# Patient Record
Sex: FEMALE | HISPANIC OR LATINO | URBAN - METROPOLITAN AREA
[De-identification: names, ages, dates, MRNs, and addresses within clinical notes are randomized per-mention and may not be internally consistent; named-entity substitution may affect disease eponyms.]

---

## 2021-08-02 ENCOUNTER — APPOINTMENT (OUTPATIENT)
Dept: RADIOLOGY | Facility: IMAGING CENTER | Age: 21
End: 2021-08-02
Attending: FAMILY MEDICINE
Payer: MEDICAID

## 2021-08-02 ENCOUNTER — OFFICE VISIT (OUTPATIENT)
Dept: URGENT CARE | Facility: CLINIC | Age: 21
End: 2021-08-02
Payer: MEDICAID

## 2021-08-02 VITALS
OXYGEN SATURATION: 99 % | BODY MASS INDEX: 23.74 KG/M2 | SYSTOLIC BLOOD PRESSURE: 114 MMHG | HEIGHT: 62 IN | DIASTOLIC BLOOD PRESSURE: 72 MMHG | HEART RATE: 75 BPM | RESPIRATION RATE: 12 BRPM | TEMPERATURE: 98 F | WEIGHT: 129 LBS

## 2021-08-02 DIAGNOSIS — M25.552 LEFT HIP PAIN: ICD-10-CM

## 2021-08-02 PROCEDURE — 73502 X-RAY EXAM HIP UNI 2-3 VIEWS: CPT | Mod: TC,LT | Performed by: FAMILY MEDICINE

## 2021-08-02 PROCEDURE — 99204 OFFICE O/P NEW MOD 45 MIN: CPT | Performed by: FAMILY MEDICINE

## 2021-08-02 PROCEDURE — 72100 X-RAY EXAM L-S SPINE 2/3 VWS: CPT | Mod: TC | Performed by: FAMILY MEDICINE

## 2021-08-03 NOTE — PROGRESS NOTES
"Subjective:      Chief Complaint   Patient presents with   • Hip Pain     x 2 year L side. Pt. is in the [employer] and injured her L hip in training. She heard a pop on the five fifty cord. She has pain when she walks for a long distance or exercise.               DOI:  7/22      She tripped over some cord and fell on hard ground, landing on left hip.        Now c/o intermittent, left hip soreness, but sometimes the pain is sharp but the pain does not radiate.    She also notes some mild left lumbar pain, since the injury.   Pain is worse with wtbearing.          Social hx - denies tobacco, alcohol, drug use  Past medical history was unremarkable and not pertinent to current issue  No Known Allergies  Family history was reviewed and not pertinent         Social History     Tobacco Use   • Smoking status: Never Smoker   • Smokeless tobacco: Never Used   Vaping Use   • Vaping Use: Never used   Substance Use Topics   • Alcohol use: Never   • Drug use: Never         No past medical history on file.      Current Outpatient Medications on File Prior to Visit   Medication Sig Dispense Refill   • butalbital-acetaminophen-caffeine-codeine (FIORICET W/CODEINE) -61-30 MG per capsule Take 1 Cap by mouth every 6 hours as needed for Migraine. (Patient not taking: Reported on 8/2/2021) 20 Cap 0     No current facility-administered medications on file prior to visit.             Review of Systems   Constitutional: Negative for fever.   Respiratory: Negative for shortness of breath.    Cardiovascular: Negative for chest pain.   Neurological: Negative for tingling.   All other systems reviewed and are negative.         Objective:     /72   Pulse 75   Temp 36.7 °C (98 °F) (Temporal)   Resp 12   Ht 1.575 m (5' 2\")   Wt 58.5 kg (129 lb)   SpO2 99%       Physical Exam   Constitutional: pt is oriented to person, place, and time. Pt appears well-developed. No distress.   HENT:   Head: Normocephalic and " atraumatic.   Eyes: Conjunctivae are normal.   Cardiovascular: Normal rate.    Pulmonary/Chest: Effort normal.   Musculoskeletal:        Left hip: pt exhibits tenderness (over greater trochanter). Pt exhibits normal range of motion, normal strength and no crepitus.   No bruising or swelling       Lumbar spine: Normal ROM. Pt exhibits no left sided muscular tenderness.   Neurological: pt is alert and oriented to person, place, and time.   Skin: Skin is warm. He is not diaphoretic. No erythema.   Psychiatric: pt behavior is normal.   Nursing note and vitals reviewed.                            Reading Physician Reading Date Result Priority   Caro Freedman M.D.  [phone] 8/2/2021 [facility]      Narrative & Impression     8/2/2021 5:37 PM     HISTORY/REASON FOR EXAM:  Pain Following Trauma        TECHNIQUE/ EXAM DESCRIPTION AND NUMBER OF VIEWS:  3 views of the lumbar spine.     COMPARISON: None.     FINDINGS:  There are 5 nonrib-bearing lumbar vertebra. There is minimal retrolisthesis of L5 on S1. Intervertebral disc spaces are maintained. Alignment of the facet joints is maintained. No compression fracture is identified.     IMPRESSION:     1.  Minimal retrolisthesis of L5 on S1.  2.  No compression fracture is identified.         Last Resulted: 08/02/21  5:53 PM           Details    Reading Physician Reading Date Result Priority   Caro Freedman M.D.  [phone] 8/2/2021 Urgent [facility]      Narrative & Impression     8/2/2021 5:37 PM     HISTORY/REASON FOR EXAM:  Pelvic/Hip Pain Following Trauma.        TECHNIQUE/EXAM DESCRIPTION AND NUMBER OF VIEWS:  2 views of the LEFT hip.     COMPARISON: None     FINDINGS:  Femoral heads are seated within the acetabula. No fracture or dislocation is seen. There is no diastases of the symphysis pubis. SI joints are symmetric.     IMPRESSION:     No fracture or dislocation is seen.         Last Resulted: 08/02/21  5:51 PM               Assessment/Plan:           1.  Left hip strain  2. Lumbar strain       xrays personally reviewed.  No fracture.          Minimal retrolisthesis of L5 on S1.    Recommend light duty x 1 wk      - Diclofenac Sodium (VOLTAREN) 1 % Gel; Apply 4 g to skin as directed 3 times a day as needed.  Dispense: 1 Tube; Refill: 0    Follow up in one week if no improvement, sooner if symptoms worsen.           My total time spent caring for the patient on the day of the encounter was at least 45 minutes.   This does not include time spent on separately billable procedures/tests.

## 2021-08-09 ENCOUNTER — OFFICE VISIT (OUTPATIENT)
Dept: URGENT CARE | Facility: CLINIC | Age: 21
End: 2021-08-09
Payer: OTHER GOVERNMENT

## 2021-08-09 VITALS
HEIGHT: 62 IN | BODY MASS INDEX: 24.66 KG/M2 | DIASTOLIC BLOOD PRESSURE: 64 MMHG | HEART RATE: 74 BPM | RESPIRATION RATE: 14 BRPM | TEMPERATURE: 97.9 F | SYSTOLIC BLOOD PRESSURE: 110 MMHG | WEIGHT: 134 LBS | OXYGEN SATURATION: 100 %

## 2021-08-09 DIAGNOSIS — S76.012A MUSCLE STRAIN OF LEFT HIP, INITIAL ENCOUNTER: ICD-10-CM

## 2021-08-09 DIAGNOSIS — M25.552 LEFT HIP PAIN: ICD-10-CM

## 2021-08-09 PROCEDURE — 99214 OFFICE O/P EST MOD 30 MIN: CPT | Performed by: NURSE PRACTITIONER

## 2021-08-09 ASSESSMENT — ENCOUNTER SYMPTOMS
TINGLING: 0
BACK PAIN: 0
FEVER: 0
FALLS: 0

## 2021-08-09 NOTE — PROGRESS NOTES
"Pina Ovalle is a 21 y.o. female who presents for Hip Pain ((L) x 7 days follow up. )      HPI This is a new problem.  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 Now increased pain after a trip and stumble while on duty with  July 22, 2021. She was walking and tripped over the cord. She is in the [employer]. Rest helps. Pain occurs randomly. Pain lasts only seconds. Pain 8/10.  Walking or carrying weight makes the pain worse.   Pain in all around in her left hip.  Full movement of her hip. Treatment tried: ice, ibuprofen, tylenol. Does not help. No sports activities. 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  Seen in urgent care on Aug 8 2021. She did not  RX cream that was prescribed.     Review of Systems   Constitutional: Negative for fever.   Musculoskeletal: Positive for joint pain. Negative for back pain and falls.   Neurological: Negative for tingling.       Allergies:       Allergies   Allergen Reactions   • Nkda [No Known Drug Allergy]        PMSFS Hx:  History reviewed. No pertinent past medical history.  History reviewed. No pertinent surgical history.  History reviewed. No pertinent family history.  Social History     Tobacco Use   • Smoking status: Never Smoker   • Smokeless tobacco: Never Used   Substance Use Topics   • Alcohol use: Never       Problems:   There is no problem list on file for this patient.      Medications:   Current Outpatient Medications on File Prior to Visit   Medication Sig Dispense Refill   • butalbital-acetaminophen-caffeine-codeine (FIORICET W/CODEINE) -58-30 MG per capsule Take 1 Cap by mouth every 6 hours as needed for Migraine. 20 Cap 0     No current facility-administered medications on file prior to visit.          Objective:     /64   Pulse 74   Temp 36.6 °C (97.9 °F) (Temporal)   Resp 14   Ht 1.575 m (5' 2\")   Wt 60.8 kg (134 " lb)   LMP 07/30/2021   SpO2 100%   BMI 24.51 kg/m²     Physical Exam  Vitals and nursing note reviewed.   Constitutional:       General: She is not in acute distress.     Appearance: She is well-developed. She is not toxic-appearing.   HENT:      Head: Normocephalic.   Cardiovascular:      Rate and Rhythm: Normal rate and regular rhythm.      Pulses: Normal pulses.   Pulmonary:      Effort: Pulmonary effort is normal.      Breath sounds: Normal breath sounds.   Musculoskeletal:      Left hip: Normal.        Legs:       Comments: Left hip pops with examination.      Skin:     General: Skin is warm and dry.      Capillary Refill: Capillary refill takes less than 2 seconds.   Neurological:      Mental Status: She is alert and oriented to person, place, and time.   Psychiatric:         Mood and Affect: Mood normal.         Behavior: Behavior normal. Behavior is cooperative.         Assessment /Associated Orders:      1. Left hip pain  REFERRAL TO PHYSICAL THERAPY    AMB REFERRAL TO ESTABLISH WITH [facility]   2. Muscle strain of left hip, initial encounter  AMB REFERRAL TO ESTABLISH WITH [facility]CP         Medical Decision Making:    Pt is clinically stable at today's acute urgent care visit.  No acute distress noted. Appropriate for outpatient management at this time.   Acute problem today with uncertain prognosis.    Referral to PT  Referral to PCP      RX at pharmacy Vol[facility] gel  Daily stretches   Physical therapy   OTC  analgesic of choice (acetaminophen or NSAID). Follow manufactures dosing and safety precautions.   Activity as tolerated.       Advised to follow-up with the primary care provider for recheck, reevaluation, and consideration of further management if necessary.   Discussed management options (risks,benefits, and alternatives to treatment). Expressed understanding and the treatment plan was agreed upon. Questions were encouraged and answered       Return to urgent care prn if new or  worsening sx or if there is no improvement in condition prn.  Educated in Red flags and indications to immediately call 911 or present to the Emergency Department.     I personally reviewed prior external notes and test results pertinent to today's visit.  I have independently reviewed and interpreted all diagnostics ordered during this urgent care acute visit.   Time spent evaluating this patient was at least 30 minutes and includes preparing for visit, counseling/education, exam and evaluation, obtaining history, independent interpretation, ordering lab/test/procedures,medication management and documentation.Time does not include separately billable procedures noted .

## 2021-09-07 ENCOUNTER — TELEPHONE (OUTPATIENT)
Dept: URGENT CARE | Facility: CLINIC | Age: 21
End: 2021-09-07

## 2021-09-07 NOTE — TELEPHONE ENCOUNTER
"Pt called requesting an update on her PT referral (referral dept number provided by Benjamin MULLER).     Pt is also requesting paperwork be filled out for the  \"evaluation by a non  provider,\" if possible. Advised pt she may need to see PCP for this but will fwd to Maria Guadalupe in case she is able.   "

## 2021-09-08 NOTE — TELEPHONE ENCOUNTER
Called to inform pt that we will NOT be able to complete her paperwork in urgent care and to contact our referrals dept (number provided) to get her PT visits approved an scheduled. Pt had no further questions.

## 2022-08-26 ENCOUNTER — OFFICE VISIT (OUTPATIENT)
Dept: URGENT CARE | Facility: CLINIC | Age: 22
End: 2022-08-26
Payer: MEDICAID

## 2022-08-26 VITALS
HEIGHT: 62 IN | RESPIRATION RATE: 12 BRPM | OXYGEN SATURATION: 99 % | DIASTOLIC BLOOD PRESSURE: 62 MMHG | BODY MASS INDEX: 23.81 KG/M2 | TEMPERATURE: 97.9 F | SYSTOLIC BLOOD PRESSURE: 112 MMHG | HEART RATE: 69 BPM | WEIGHT: 129.4 LBS

## 2022-08-26 DIAGNOSIS — K59.01 SLOW TRANSIT CONSTIPATION: ICD-10-CM

## 2022-08-26 DIAGNOSIS — R12 HEARTBURN: ICD-10-CM

## 2022-08-26 PROCEDURE — 99213 OFFICE O/P EST LOW 20 MIN: CPT | Performed by: NURSE PRACTITIONER

## 2022-08-27 ASSESSMENT — ENCOUNTER SYMPTOMS
ABDOMINAL PAIN: 0
CONSTIPATION: 1
HOARSE VOICE: 1
SORE THROAT: 1

## 2022-08-27 NOTE — PROGRESS NOTES
Subjective     Pina Ovalle is a 22 y.o. female who presents with Pharyngitis (Pt is fatigue, sore throat, body ache, constipation x 2 week )            Pharyngitis   This is a new problem. Episode onset: pt reports she noticed a ST and hoarse voice that started a few days ago. She admits to recent constipation as she has not had a BM in several days. reports her appetite is normal and she continues to eat. her voice is most notably hoarse in the am. Neither side of throat is experiencing more pain than the other. There has been no fever. Associated symptoms include a hoarse voice. Pertinent negatives include no abdominal pain. Associated symptoms comments: Pt admits that she did a body building competition a few weeks ago and has been very strict with her diet for a few months. When the competition was over, she admits to binge eating all the food she wasn't allowed to have. Most of which is high carb and high sugar. After this is when she became constipated and developed a ST with hoarse voice. She has tried nothing for the symptoms.     Review of Systems   HENT:  Positive for hoarse voice and sore throat.    Gastrointestinal:  Positive for constipation. Negative for abdominal pain.   All other systems reviewed and are negative.       History reviewed. No pertinent past medical history. History reviewed. No pertinent surgical history.   Social History     Socioeconomic History    Marital status: Single     Spouse name: Not on file    Number of children: Not on file    Years of education: Not on file    Highest education level: Not on file   Occupational History    Not on file   Tobacco Use    Smoking status: Never    Smokeless tobacco: Never   Vaping Use    Vaping Use: Never used   Substance and Sexual Activity    Alcohol use: Never    Drug use: Never    Sexual activity: Not on file   Other Topics Concern    Behavioral problems Not Asked    Interpersonal relationships Not Asked    Sad or not enjoying  "activities Not Asked    Suicidal thoughts Not Asked    Poor school performance Not Asked    Reading difficulties Not Asked    Speech difficulties Not Asked    Writing difficulties Not Asked    Inadequate sleep Not Asked    Excessive TV viewing Not Asked    Excessive video game use Not Asked    Inadequate exercise Not Asked    Sports related Not Asked    Poor diet Not Asked    Family concerns for drug/alcohol abuse Not Asked    Poor oral hygiene Not Asked    Bike safety Not Asked    Family concerns vehicle safety Not Asked   Social History Narrative    Not on file     Social Determinants of Health     Financial Resource Strain: Not on file   Food Insecurity: Not on file   Transportation Needs: Not on file   Physical Activity: Not on file   Stress: Not on file   Social Connections: Not on file   Intimate Partner Violence: Not on file   Housing Stability: Not on file         Objective     /62 (BP Location: Left arm, Patient Position: Sitting, BP Cuff Size: Adult)   Pulse 69   Temp 36.6 °C (97.9 °F) (Temporal)   Resp 12   Ht 1.575 m (5' 2\")   Wt 58.7 kg (129 lb 6.4 oz)   SpO2 99%   BMI 23.67 kg/m²      Physical Exam  Vitals and nursing note reviewed.   Constitutional:       Appearance: Normal appearance. She is normal weight.   HENT:      Head: Normocephalic and atraumatic.      Nose: Nose normal.      Mouth/Throat:      Mouth: Mucous membranes are moist.      Pharynx: Oropharynx is clear. No posterior oropharyngeal erythema.   Eyes:      Extraocular Movements: Extraocular movements intact.      Pupils: Pupils are equal, round, and reactive to light.   Cardiovascular:      Rate and Rhythm: Normal rate and regular rhythm.   Pulmonary:      Effort: Pulmonary effort is normal.   Abdominal:      General: Abdomen is flat.      Tenderness: There is no abdominal tenderness. There is no guarding or rebound.   Musculoskeletal:         General: Normal range of motion.      Cervical back: Normal range of motion. "   Skin:     General: Skin is warm and dry.      Capillary Refill: Capillary refill takes less than 2 seconds.   Neurological:      General: No focal deficit present.      Mental Status: She is alert and oriented to person, place, and time. Mental status is at baseline.   Psychiatric:         Mood and Affect: Mood normal.         Speech: Speech normal.         Thought Content: Thought content normal.         Judgment: Judgment normal.                           Assessment & Plan        1. Heartburn    2. Slow transit constipation    She is likely experiencing reflux secondary to constipation and abd fullness  Encouraged pt to use OTC pepcid daily for 2 weeks at least  Daily miralax until stool is soft and regular  Try to make better choices with foods and have a more balanced diet  Increase water intake  Encouraged light exercise  Red flags discussed and when to seek care in the ER  Supportive care, differential diagnoses, and indications for immediate follow-up discussed with patient.    Pathogenesis of diagnosis discussed including typical length and natural progression.    Instructed to return to  or nearest emergency department if symptoms fail to improve, for any change in condition, further concerns, or new concerning symptoms.  Patient states understanding of the plan of care and discharge instructions.